# Patient Record
Sex: MALE | Race: BLACK OR AFRICAN AMERICAN | Employment: FULL TIME | URBAN - NONMETROPOLITAN AREA
[De-identification: names, ages, dates, MRNs, and addresses within clinical notes are randomized per-mention and may not be internally consistent; named-entity substitution may affect disease eponyms.]

---

## 2024-04-23 ENCOUNTER — HOSPITAL ENCOUNTER (EMERGENCY)
Age: 39
Discharge: HOME | End: 2024-04-24
Attending: EMERGENCY MEDICINE

## 2024-04-23 DIAGNOSIS — N34.2 URETHRITIS: Primary | ICD-10-CM

## 2024-04-23 PROCEDURE — 99284 EMERGENCY DEPT VISIT MOD MDM: CPT

## 2024-04-23 PROCEDURE — 96372 THER/PROPH/DIAG INJ SC/IM: CPT

## 2024-04-24 VITALS
OXYGEN SATURATION: 98 % | TEMPERATURE: 98 F | RESPIRATION RATE: 18 BRPM | DIASTOLIC BLOOD PRESSURE: 106 MMHG | HEART RATE: 92 BPM | SYSTOLIC BLOOD PRESSURE: 158 MMHG

## 2024-04-24 LAB
BACTERIA URNS QL MICRO: ABNORMAL /HPF
BILIRUB UR QL STRIP.AUTO: NEGATIVE
CASTS #/AREA URNS LPF: ABNORMAL /LPF
CASTS 2: ABNORMAL /LPF
CHARACTER UR: CLEAR
CHLAMYDIA TRACHOMATIS BY RT-PCR: NOT DETECTED
COLOR: YELLOW
CRYSTALS URNS MICRO: ABNORMAL
CT/NG SOURCE: NORMAL
EPITHELIAL CELLS, UA: ABNORMAL /HPF
GLUCOSE UR QL STRIP.AUTO: NEGATIVE MG/DL
HGB UR QL STRIP.AUTO: NEGATIVE
KETONES UR QL STRIP.AUTO: NEGATIVE
MISCELLANEOUS 2: ABNORMAL
NEISSERIA GONORRHOEAE BY RT-PCR: NOT DETECTED
NITRITE UR QL STRIP: NEGATIVE
PH UR STRIP.AUTO: 5.5 [PH] (ref 5–9)
PROT UR STRIP.AUTO-MCNC: NEGATIVE MG/DL
RBC URINE: ABNORMAL /HPF
RENAL EPI CELLS #/AREA URNS HPF: ABNORMAL /[HPF]
SP GR UR REFRACT.AUTO: 1.01 (ref 1–1.03)
UROBILINOGEN, URINE: 0.2 EU/DL (ref 0–1)
WBC #/AREA URNS HPF: ABNORMAL /HPF
WBC #/AREA URNS HPF: ABNORMAL /[HPF]
YEAST LIKE FUNGI URNS QL MICRO: ABNORMAL

## 2024-04-24 PROCEDURE — 87086 URINE CULTURE/COLONY COUNT: CPT

## 2024-04-24 PROCEDURE — 6360000002 HC RX W HCPCS: Performed by: EMERGENCY MEDICINE

## 2024-04-24 PROCEDURE — 87491 CHLMYD TRACH DNA AMP PROBE: CPT

## 2024-04-24 PROCEDURE — 87591 N.GONORRHOEAE DNA AMP PROB: CPT

## 2024-04-24 PROCEDURE — 2500000003 HC RX 250 WO HCPCS: Performed by: EMERGENCY MEDICINE

## 2024-04-24 PROCEDURE — 6370000000 HC RX 637 (ALT 250 FOR IP): Performed by: EMERGENCY MEDICINE

## 2024-04-24 PROCEDURE — 81001 URINALYSIS AUTO W/SCOPE: CPT

## 2024-04-24 RX ORDER — AZITHROMYCIN 250 MG/1
1000 TABLET, FILM COATED ORAL ONCE
Status: COMPLETED | OUTPATIENT
Start: 2024-04-24 | End: 2024-04-24

## 2024-04-24 RX ORDER — METRONIDAZOLE 500 MG/1
500 TABLET ORAL ONCE
Status: COMPLETED | OUTPATIENT
Start: 2024-04-24 | End: 2024-04-24

## 2024-04-24 RX ADMIN — AZITHROMYCIN DIHYDRATE 1000 MG: 250 TABLET ORAL at 00:55

## 2024-04-24 RX ADMIN — METRONIDAZOLE 500 MG: 500 TABLET ORAL at 00:55

## 2024-04-24 RX ADMIN — LIDOCAINE HYDROCHLORIDE 250 MG: 10 INJECTION, SOLUTION EPIDURAL; INFILTRATION; INTRACAUDAL; PERINEURAL at 00:55

## 2024-04-24 ASSESSMENT — PAIN SCALES - GENERAL: PAINLEVEL_OUTOF10: 10

## 2024-04-24 ASSESSMENT — PAIN - FUNCTIONAL ASSESSMENT: PAIN_FUNCTIONAL_ASSESSMENT: 0-10

## 2024-04-24 NOTE — ED PROVIDER NOTES
[facility] EMERGENCY DEPT  [address] [facility] [address]  Phone: [phone]      CHIEF COMPLAINT       Chief Complaint   Patient presents with    Exposure to STD       Nurses Notes reviewed and I agree except as noted in the HPI.      HISTORY OF PRESENT ILLNESS    John Camilo is a 38 y.o. male.    Patient complains of symptoms of urethritis with burning and discharge.  He states that his most recent previous partner tested positive for gonorrhea and chlamydia,  and he would like treatment.  He has not noted any sores or testicular pain    REVIEW OF SYSTEMS         No fever    Remainder of review of systems is otherwise reviewed as negative.      PAST MEDICAL HISTORY    has no past medical history on file.    SURGICAL HISTORY      has no past surgical history on file.    CURRENT MEDICATIONS     There are no discharge medications for this patient.      ALLERGIES     is allergic to peanut-containing drug products.    FAMILY HISTORY     has no family status information on file.    family history is not on file.    SOCIAL HISTORY          PHYSICAL EXAM     INITIAL VITALS:  oral temperature is 98 °F (36.7 °C). His blood pressure is 158/106 (abnormal) and his pulse is 92. His respiration is 18 and oxygen saturation is 98%.      Constitutional: Well appearing and non-toxic   HENT:  Atraumatic appearing  Respiratory:  No wheezing  Cardiovascular: regular  GI:  Non tender, no rigidity, rebound or guarding  : Normal-appearing male genitalia with no obvious sores or ulcerations.  I did not see any discharge at the time of the exam, but he reportedly had it earlier.  no testicular tenderness.  No inguinal lymphadenopathy  Integument: warm and dry       DIAGNOSTIC RESULTS        LABS:   Labs Reviewed   C. TRACHOMATIS / N. GONORRHOEAE, DNA   URINALYSIS WITH REFLEX TO CULTURE       EMERGENCY DEPARTMENT COURSE:   Vitals:    Vitals:    04/24/24 0000   BP: (!) 158/106   Pulse: 92   Resp: 18   Temp: 98 °F (36.7 °C)

## 2024-04-24 NOTE — DISCHARGE INSTRUCTIONS
Finish antibiotics as directed.  No sexual activity for 2 weeks.  Follow-up with your doctor in 3 to 5 days

## 2024-04-24 NOTE — ED TRIAGE NOTES
Pt to the ED for STD check and treatment. States his girlfriend tested positive for several STD's. Pt reports green discharge from his penis along with burning when he urinates.

## 2024-04-26 LAB — BACTERIA UR CULT: NORMAL
